# Patient Record
Sex: MALE | Race: WHITE | Employment: FULL TIME | URBAN - METROPOLITAN AREA
[De-identification: names, ages, dates, MRNs, and addresses within clinical notes are randomized per-mention and may not be internally consistent; named-entity substitution may affect disease eponyms.]

---

## 2018-07-07 ENCOUNTER — HOSPITAL ENCOUNTER (OUTPATIENT)
Dept: LAB | Age: 58
Discharge: HOME | End: 2018-07-07

## 2018-07-07 LAB — SENTARA SPECIMEN COL,SENBCF: NORMAL

## 2018-07-07 PROCEDURE — 99001 SPECIMEN HANDLING PT-LAB: CPT | Performed by: FAMILY MEDICINE

## 2020-03-06 ENCOUNTER — OFFICE VISIT (OUTPATIENT)
Dept: CARDIOLOGY CLINIC | Age: 60
End: 2020-03-06

## 2020-03-06 VITALS
BODY MASS INDEX: 26.32 KG/M2 | DIASTOLIC BLOOD PRESSURE: 80 MMHG | WEIGHT: 188 LBS | HEART RATE: 93 BPM | OXYGEN SATURATION: 97 % | SYSTOLIC BLOOD PRESSURE: 118 MMHG | HEIGHT: 71 IN

## 2020-03-06 DIAGNOSIS — L72.3 SEBACEOUS CYST: Primary | ICD-10-CM

## 2020-03-06 RX ORDER — IBUPROFEN 200 MG
1 TABLET ORAL EVERY 24 HOURS
Qty: 30 PATCH | Refills: 0 | Status: SHIPPED | OUTPATIENT
Start: 2020-03-06 | End: 2020-03-06 | Stop reason: SDUPTHER

## 2020-03-06 RX ORDER — NICOTINE 7MG/24HR
1 PATCH, TRANSDERMAL 24 HOURS TRANSDERMAL EVERY 24 HOURS
Qty: 30 PATCH | Refills: 0 | Status: SHIPPED | OUTPATIENT
Start: 2020-03-06 | End: 2020-04-05

## 2020-03-06 RX ORDER — NICOTINE 7MG/24HR
1 PATCH, TRANSDERMAL 24 HOURS TRANSDERMAL EVERY 24 HOURS
Qty: 30 PATCH | Refills: 0 | Status: SHIPPED | OUTPATIENT
Start: 2020-03-06 | End: 2020-03-06 | Stop reason: SDUPTHER

## 2020-03-06 RX ORDER — ASPIRIN 81 MG/1
TABLET ORAL DAILY
COMMUNITY

## 2020-03-06 RX ORDER — IBUPROFEN 200 MG
1 TABLET ORAL EVERY 24 HOURS
Qty: 30 PATCH | Refills: 0 | Status: SHIPPED | OUTPATIENT
Start: 2020-03-06 | End: 2020-04-05

## 2020-03-06 RX ORDER — ATORVASTATIN CALCIUM 40 MG/1
TABLET, FILM COATED ORAL
Qty: 30 TAB | Refills: 5 | Status: SHIPPED | OUTPATIENT
Start: 2020-03-06 | End: 2020-03-06 | Stop reason: SDUPTHER

## 2020-03-06 RX ORDER — ATORVASTATIN CALCIUM 40 MG/1
TABLET, FILM COATED ORAL
Qty: 30 TAB | Refills: 5 | Status: SHIPPED | OUTPATIENT
Start: 2020-03-06 | End: 2020-09-26

## 2020-03-06 NOTE — PROGRESS NOTES
Val Medrano    Chief Complaint   Patient presents with    New Patient     referrel from [facility] for CAD       HPI    Val Medrano is a 60 y.o. extremely pleasant gentleman with coronary artery disease, hypertension, dyslipidemia, longstanding tobacco abuse here to establish his long-term cardiovascular care. As you know this gentleman was recognized as having artery disease back in 1999. Details are somewhat unknown at the time of my encounter but it sounds like he had disease in the proximal left anterior descending artery and stenting \"didn't work\" ultimately had single-vessel LIMA to LAD. He says he is done fine since then, never experiencing that type of chest discomfort again. He is usually quite active works in management for [employer] but admits he does not exercise the way that he should, could do better with his diet, and is actively trying to quit smoking again. He feels ready to quit and thinks a good quit date is even this weekend. He has a prescription for Chantix plans to start this weekend. He has smoked for many decades-really since he was 24years old and this is been a lifelong struggle to talk try to stop. He tells me he is cut down significantly definitely less than a pack a day sometimes even less than 10 cigarettes a day. His wife also smokes but oddly more socially and never more than 1 or 2 cigarettes in a day. Again he has no complaints today no chest pain no shortness of breath no lower extremity edema his medications were reviewed below and he has been on these medications since his initial bypass years ago.     Past Medical History:   Diagnosis Date    Hypercholesterolemia     Hypertension        Past Surgical History:   Procedure Laterality Date    HX CORONARY ARTERY BYPASS GRAFT      HX CYST REMOVAL         Current Outpatient Medications   Medication Sig Dispense Refill    budesonide (RHINOCORT AQUA) 32 mcg/actuation nasal spray   7    cetirizine (ZYRTEC) 10 mg tablet Take  by mouth.  aspirin (ASPIRIN) 325 mg tablet Take 325 mg by mouth daily.  simvastatin (ZOCOR) 20 mg tablet Take  by mouth nightly.  oxyCODONE-acetaminophen (PERCOCET) 5-325 mg per tablet Take 1 Tab by mouth every four (4) hours as needed (BREAKTHROUGH PAIN ONLY). Max Daily Amount: 6 Tabs. 20 Tab 0       Allergies   Allergen Reactions    Amoxicillin Anaphylaxis       Social History     Socioeconomic History    Marital status:      Spouse name: Not on file    Number of children: Not on file    Years of education: Not on file    Highest education level: Not on file   Occupational History    Not on file   Social Needs    Financial resource strain: Not on file    Food insecurity:     Worry: Not on file     Inability: Not on file    Transportation needs:     Medical: Not on file     Non-medical: Not on file   Tobacco Use    Smoking status: Never Smoker    Smokeless tobacco: Never Used   Substance and Sexual Activity    Alcohol use: Yes    Drug use: Never    Sexual activity: Never   Lifestyle    Physical activity:     Days per week: Not on file     Minutes per session: Not on file    Stress: Not on file   Relationships    Social connections:     Talks on phone: Not on file     Gets together: Not on file     Attends Zoroastrianism service: Not on file     Active member of club or organization: Not on file     Attends meetings of clubs or organizations: Not on file     Relationship status: Not on file    Intimate partner violence:     Fear of current or ex partner: Not on file     Emotionally abused: Not on file     Physically abused: Not on file     Forced sexual activity: Not on file   Other Topics Concern    Not on file   Social History Narrative    Not on file      FH: no premature CAD, no sudden cardiac death, pt's brother  of [place]    Review of Systems    15 pt Review of Systems is negative unless otherwise mentioned in the HPI.     Wt Readings from Last 3 Encounters:   03/06/20 85.3 kg (188 lb)   06/13/16 80.7 kg (178 lb)   05/02/16 77.1 kg (170 lb)     Temp Readings from Last 3 Encounters:   06/13/16 97.9 °F (36.6 °C)   05/02/16 96.2 °F (35.7 °C)     BP Readings from Last 3 Encounters:   03/06/20 118/80   06/13/16 113/77   05/02/16 113/80     Pulse Readings from Last 3 Encounters:   03/06/20 93   06/13/16 89   05/02/16 78     Physical Exam:    Visit Vitals  /80 (BP 1 Location: Left arm, BP Patient Position: Sitting)   Pulse 93   Ht 5' 11\" (1.803 m)   Wt 85.3 kg (188 lb)   SpO2 97%   BMI 26.22 kg/m²      Physical Exam  HENT:      Head: Normocephalic and atraumatic. Eyes:      General: No scleral icterus. Pupils: Pupils are equal, round, and reactive to light. Cardiovascular:      Rate and Rhythm: Normal rate and regular rhythm. Heart sounds: Normal heart sounds. No murmur. No friction rub. No gallop. Pulmonary:      Effort: Pulmonary effort is normal. No respiratory distress. Breath sounds: Normal breath sounds. No wheezing or rales. Chest:      Chest wall: No tenderness. Abdominal:      General: Bowel sounds are normal.      Palpations: Abdomen is soft. Skin:     General: Skin is warm and dry. Findings: No rash. Neurological:      Mental Status: He is alert and oriented to person, place, and time.          EKG today shows: NSR, normal axis and intervals, no ST segment abnormalities    2/1/20 FLP: , HDL 59, LDL 51   SCr 0.8    Impression and Plan:  Brandin Later is a 60 y.o. with:    1.) CAD s/p LIMA to LAD 1999, stable  2.) HTN, well controlled  3.) Longstanding tobacco abuse    1.) Recommend 81 enteric coated baby ASA daily (instead of 325 mg daily)  2.) Change Simva to Atorva 40 qhs for CAD  3.) >20 mins spent with tobacco cessation education, incl Chantix + Nicotine replacement therapy (patches given and can use gum OTC if needed)  4.) RTC yearly routine CAD recheck with Dr. Erum Vasquez by pt request    >60 mins spent  Personally obtained and reviewed PCP office records as well as recent blood work from February 2020  Overall patient doing well and asymptomatic so no further testing at this time  Significant time spent regarding education for CAD, signs and symptoms of ACS  Majority of education spent with help tobacco cessation  All questions answered    Thank you for allowing me to participate in the care of your patient, please do not hesitate to call with questions or concerns.     Kindest Regards,    Luis Lima, DO

## 2020-03-06 NOTE — PROGRESS NOTES
Patricia Krishna presents today for   Chief Complaint   Patient presents with    New Patient     referrel from [address] [place] [address] for CAD       Patricia Krishna preferred language for health care discussion is english/other. Is someone accompanying this pt? no    Is the patient using any DME equipment during [address]? no    Depression Screening:  3 most recent PHQ Screens 3/6/2020   Little interest or pleasure in doing things Not at all   Feeling down, depressed, irritable, or hopeless Not at all   Total Score PHQ 2 0       Learning Assessment:  No flowsheet data found. Abuse Screening:  Abuse Screening Questionnaire 3/6/2020   Do you ever feel afraid of your partner? N   Are you in a relationship with someone who physically or mentally threatens you? N   Is it safe for you to go home? Y       Fall Risk  Fall Risk Assessment, last 12 mths 3/6/2020   Able to walk? Yes   Fall in past 12 months? No       Pt currently taking Anticoagulant therapy?  mg    Coordination of Care:  1. Have you been to the ER, urgent care clinic since your last visit? Hospitalized since your last visit? no    2. Have you seen or consulted any other health care providers outside of the [address] [place] [address] since your last visit? Include any pap smears or colon screening.  no    '

## 2020-03-06 NOTE — PATIENT INSTRUCTIONS
Stop Simvastatin Start Atorvastatin 40mg once nightly Start nicotine patch Follow up 1 year with Dr. Abdulaziz Crowell

## 2020-09-26 RX ORDER — ATORVASTATIN CALCIUM 40 MG/1
TABLET, FILM COATED ORAL
Qty: 30 TAB | Refills: 5 | Status: SHIPPED | OUTPATIENT
Start: 2020-09-26 | End: 2021-03-01

## 2021-03-01 RX ORDER — ATORVASTATIN CALCIUM 40 MG/1
TABLET, FILM COATED ORAL
Qty: 90 TAB | Refills: 3 | Status: SHIPPED | OUTPATIENT
Start: 2021-03-01 | End: 2022-05-03

## 2022-05-03 RX ORDER — ATORVASTATIN CALCIUM 40 MG/1
TABLET, FILM COATED ORAL
Qty: 90 TABLET | Refills: 3 | Status: SHIPPED | OUTPATIENT
Start: 2022-05-03

## 2024-10-15 ENCOUNTER — OFFICE VISIT (OUTPATIENT)
Age: 64
End: 2024-10-15
Payer: COMMERCIAL

## 2024-10-15 VITALS
HEIGHT: 71 IN | HEART RATE: 87 BPM | BODY MASS INDEX: 26.18 KG/M2 | WEIGHT: 187 LBS | OXYGEN SATURATION: 95 % | DIASTOLIC BLOOD PRESSURE: 74 MMHG | SYSTOLIC BLOOD PRESSURE: 118 MMHG

## 2024-10-15 DIAGNOSIS — L72.3 SEBACEOUS CYST: Primary | ICD-10-CM

## 2024-10-15 DIAGNOSIS — R06.02 SHORTNESS OF BREATH: ICD-10-CM

## 2024-10-15 PROBLEM — B02.9 HERPES ZOSTER: Status: ACTIVE | Noted: 2017-12-12

## 2024-10-15 PROBLEM — J30.9 ALLERGIC RHINITIS: Status: ACTIVE | Noted: 2024-10-15

## 2024-10-15 PROBLEM — E11.9 TYPE 2 DIABETES MELLITUS WITHOUT COMPLICATION (HCC): Status: ACTIVE | Noted: 2022-11-10

## 2024-10-15 PROBLEM — Z72.0 TOBACCO USER: Status: ACTIVE | Noted: 2020-01-30

## 2024-10-15 PROBLEM — J01.90 ACUTE SINUSITIS: Status: ACTIVE | Noted: 2024-10-15

## 2024-10-15 PROBLEM — Z86.0100 HISTORY OF COLONIC POLYPS: Status: ACTIVE | Noted: 2018-12-13

## 2024-10-15 PROBLEM — M72.2 PLANTAR FASCIITIS: Status: ACTIVE | Noted: 2024-10-15

## 2024-10-15 PROBLEM — F17.200 TOBACCO DEPENDENCE SYNDROME: Status: ACTIVE | Noted: 2024-10-15

## 2024-10-15 PROBLEM — E78.5 HYPERLIPIDEMIA: Status: ACTIVE | Noted: 2024-10-15

## 2024-10-15 PROBLEM — G47.00 INSOMNIA: Status: ACTIVE | Noted: 2024-10-15

## 2024-10-15 PROBLEM — H69.90 DYSFUNCTION OF EUSTACHIAN TUBE: Status: ACTIVE | Noted: 2024-10-15

## 2024-10-15 PROBLEM — M25.549 PAIN IN FINGER JOINT ON MOVEMENT: Status: ACTIVE | Noted: 2024-10-15

## 2024-10-15 PROCEDURE — 99204 OFFICE O/P NEW MOD 45 MIN: CPT | Performed by: INTERNAL MEDICINE

## 2024-10-15 PROCEDURE — 1123F ACP DISCUSS/DSCN MKR DOCD: CPT | Performed by: INTERNAL MEDICINE

## 2024-10-15 PROCEDURE — 93000 ELECTROCARDIOGRAM COMPLETE: CPT | Performed by: INTERNAL MEDICINE

## 2024-10-15 RX ORDER — LEVOCETIRIZINE DIHYDROCHLORIDE 5 MG/1
5 TABLET, FILM COATED ORAL NIGHTLY
COMMUNITY

## 2024-10-15 RX ORDER — IPRATROPIUM BROMIDE 21 UG/1
2 SPRAY, METERED NASAL 2 TIMES DAILY
COMMUNITY

## 2024-10-15 RX ORDER — METFORMIN HCL 500 MG
500 TABLET, EXTENDED RELEASE 24 HR ORAL DAILY
COMMUNITY

## 2024-10-15 ASSESSMENT — PATIENT HEALTH QUESTIONNAIRE - PHQ9
SUM OF ALL RESPONSES TO PHQ9 QUESTIONS 1 & 2: 0
2. FEELING DOWN, DEPRESSED OR HOPELESS: NOT AT ALL
SUM OF ALL RESPONSES TO PHQ QUESTIONS 1-9: 0
1. LITTLE INTEREST OR PLEASURE IN DOING THINGS: NOT AT ALL
SUM OF ALL RESPONSES TO PHQ QUESTIONS 1-9: 0

## 2024-10-15 NOTE — PROGRESS NOTES
Bryson Naqvi presents today for   Chief Complaint   Patient presents with    New Patient       Bryson Naqvi preferred language for health care discussion is english/other.    Is someone accompanying this pt? no    Is the patient using any DME equipment during OV? no    Depression Screening:  Depression: Not at risk (10/15/2024)    PHQ-2     PHQ-2 Score: 0        Learning Assessment:  Who is the primary learner? Patient    What is the preferred language for health care of the primary learner? ENGLISH    How does the primary learner prefer to learn new concepts? DEMONSTRATION    Answered By patient    Relationship to Learner SELF           Pt currently taking Anticoagulant therapy? no    Pt currently taking Antiplatelet therapy ? aspirin      Coordination of Care:  1. Have you been to the ER, urgent care clinic since your last visit? Hospitalized since your last visit? no    2. Have you seen or consulted any other health care providers outside of the [facility] since your last visit? Include any pap smears or colon screening. no

## 2024-10-15 NOTE — PROGRESS NOTES
HISTORY OF PRESENT ILLNESS  Bryson Naqvi  64 y.o. male     Chief Complaint   Patient presents with    New Patient       ASSESSMENT and PLAN    The primary encounter diagnosis was Sebaceous cyst. A diagnosis of Shortness of breath was also pertinent to this visit.    Mr. Bryson Lala has known history of CAD.  He had chest pains back in 1999.  He had LAD stent attempt by Dr. Omero Mejia which was unsuccessful; he subsequently had single-vessel LIMA to LAD.  He has known history of dyslipidemia, and longstanding history of tobacco use.  He has NIDDM.  His brother was Larry Naqvi who passed away from metastatic lung cancer.  His wife is Tanisha Naqvi.  He works as a  for [employer].  From cardiac standpoint, he is doing well.  He has not had any recurrent episodes of chest pains.  He denies any changes in his activity levels.  He remains active physically.  Unfortunately, he still smokes cigarettes.  His blood pressure is well-controlled at 118/74.  His rhythm remains stable sinus at 87 bpm.  There is no evidence of decompensated CHF noted.  His weight today is 187 pounds.  His target LDL is less than 70.  Continue Lipitor 40 mg daily.  He still smokes about 1 pack daily.  Strong encouragement was given for smoking cessation.  All questions were answered.  He continues on baby aspirin daily.  It has been 25 years since his bypass surgery.  He has not had a stress test in at least 5 years.  I will proceed with repeat echocardiogram and exercise nuclear scan.  If the above testing is unremarkable, I will see him back in 6 months.  Thank you.    Orders Placed This Encounter   Procedures    EKG 12 Lead     Order Specific Question:   Reason for Exam?     Answer:   Other    Nuclear stress test with myocardial perfusion     Standing Status:   Future     Standing Expiration Date:   10/15/2025     Order Specific Question:   What stress agent should be used?     Answer:

## 2025-04-16 ENCOUNTER — OFFICE VISIT (OUTPATIENT)
Age: 65
End: 2025-04-16
Payer: COMMERCIAL

## 2025-04-16 VITALS
BODY MASS INDEX: 26.32 KG/M2 | SYSTOLIC BLOOD PRESSURE: 122 MMHG | WEIGHT: 188 LBS | HEIGHT: 71 IN | HEART RATE: 88 BPM | OXYGEN SATURATION: 96 % | DIASTOLIC BLOOD PRESSURE: 76 MMHG

## 2025-04-16 DIAGNOSIS — R06.02 SHORTNESS OF BREATH: Primary | ICD-10-CM

## 2025-04-16 PROCEDURE — 99214 OFFICE O/P EST MOD 30 MIN: CPT | Performed by: INTERNAL MEDICINE

## 2025-04-16 PROCEDURE — 93000 ELECTROCARDIOGRAM COMPLETE: CPT | Performed by: INTERNAL MEDICINE

## 2025-04-16 PROCEDURE — 1123F ACP DISCUSS/DSCN MKR DOCD: CPT | Performed by: INTERNAL MEDICINE

## 2025-04-16 RX ORDER — ELECTROLYTES/DEXTROSE
1 SOLUTION, ORAL ORAL
COMMUNITY

## 2025-04-16 RX ORDER — AZELASTINE 1 MG/ML
2 SPRAY, METERED NASAL DAILY
COMMUNITY
Start: 2025-02-16

## 2025-04-16 RX ORDER — FLUTICASONE PROPIONATE 50 MCG
2 SPRAY, SUSPENSION (ML) NASAL DAILY
COMMUNITY

## 2025-04-16 ASSESSMENT — PATIENT HEALTH QUESTIONNAIRE - PHQ9
2. FEELING DOWN, DEPRESSED OR HOPELESS: NOT AT ALL
SUM OF ALL RESPONSES TO PHQ QUESTIONS 1-9: 0
1. LITTLE INTEREST OR PLEASURE IN DOING THINGS: NOT AT ALL

## 2025-04-16 NOTE — PROGRESS NOTES
HISTORY OF PRESENT ILLNESS  Bryson Naqvi  64 y.o. male     Chief Complaint   Patient presents with    Follow-up       ASSESSMENT and PLAN    The encounter diagnosis was Shortness of breath.    Mr. Bryson Lala has known history of CAD.  He had chest pains back in 1999.  He had LAD stent attempt by Dr. Omero Mejia which was unsuccessful; he subsequently had single-vessel LIMA to LAD.  He has known history of dyslipidemia, and longstanding history of tobacco use.  He has NIDDM.  His brother was Larry Naqvi who passed away from metastatic lung cancer.  His wife is Tanisha Naqvi.  He works as a  for [employer].  His nuclear scan in December 2024 showed EF 68% with normal perfusion.  His echocardiogram showed EF 55-60% without significant valvular disease.    Medication regimen reviewed and current cardiac regimen will be continued.  All new testing since the last office visit was independently reviewed by me.    CAD:    Clinically stable.  HTN:    Well-controlled at 122/76.  Continue current BP regimen.  Rhythm:    Regular sinus rhythm at 88 bpm.  CHF:    There is no evidence of decompensated CHF noted.  BMI:    His weight today is 188 pounds.  His baseline weight is 185 pounds.    Hyperlipidemia:   Target LDL <70.    Continue Lipitor 40 mg daily.  Tobacco:   He still smokes about 1 pack daily.  Anti-platelet:   Continue baby ASA.    Again, strong encouragement was given for smoking cessation.  All questions were answered.    I will see him back in 6 months.  Thank you.    Orders Placed This Encounter   Procedures    EKG 12 Lead     Reason for Exam?:   Other        HPI  Today, Mr. Naqvi has no complaints of chest pains, increased dyspnea on exertion, or decreased exercise capacity.  He still works as a supervisor in construction.  He has not had any exertional chest pains.  His biggest limitation to activity is his hip pain.  He denies any orthopnea or PND.  He denies

## 2025-04-16 NOTE — PROGRESS NOTES
Bryson Naqvi presents today for   Chief Complaint   Patient presents with    Follow-up       Bryson Naqvi preferred language for health care discussion is english/other.    Is someone accompanying this pt? no    Is the patient using any DME equipment during OV? no    Depression Screening:  Depression: Not at risk (4/16/2025)    PHQ-2     PHQ-2 Score: 0        Learning Assessment:  Who is the primary learner? Patient    What is the preferred language for health care of the primary learner? ENGLISH    How does the primary learner prefer to learn new concepts? DEMONSTRATION    Answered By patient    Relationship to Learner SELF           Pt currently taking Anticoagulant therapy? no    Pt currently taking Antiplatelet therapy ? aspirin      Coordination of Care:  1. Have you been to the ER, urgent care clinic since your last visit? Hospitalized since your last visit? no    2. Have you seen or consulted any other health care providers outside of the [facility] since your last visit? Include any pap smears or colon screening. no